# Patient Record
Sex: MALE | Race: WHITE | NOT HISPANIC OR LATINO | Employment: FULL TIME | URBAN - NONMETROPOLITAN AREA
[De-identification: names, ages, dates, MRNs, and addresses within clinical notes are randomized per-mention and may not be internally consistent; named-entity substitution may affect disease eponyms.]

---

## 2024-05-06 ENCOUNTER — TRANSCRIBE ORDERS (OUTPATIENT)
Dept: CARDIOLOGY | Facility: CLINIC | Age: 54
End: 2024-05-06
Payer: MEDICARE

## 2024-05-06 ENCOUNTER — TELEPHONE (OUTPATIENT)
Dept: CARDIOLOGY | Facility: CLINIC | Age: 54
End: 2024-05-06
Payer: MEDICARE

## 2024-05-06 DIAGNOSIS — R94.31 EKG ABNORMALITY: Primary | ICD-10-CM

## 2024-05-23 ENCOUNTER — OFFICE VISIT (OUTPATIENT)
Dept: CARDIOLOGY | Facility: CLINIC | Age: 54
End: 2024-05-23
Payer: COMMERCIAL

## 2024-05-23 VITALS
OXYGEN SATURATION: 99 % | BODY MASS INDEX: 28.06 KG/M2 | HEART RATE: 72 BPM | HEIGHT: 70 IN | DIASTOLIC BLOOD PRESSURE: 80 MMHG | WEIGHT: 196 LBS | SYSTOLIC BLOOD PRESSURE: 120 MMHG | RESPIRATION RATE: 16 BRPM

## 2024-05-23 DIAGNOSIS — I49.3 PVC (PREMATURE VENTRICULAR CONTRACTION): ICD-10-CM

## 2024-05-23 DIAGNOSIS — I45.2 BIFASCICULAR BLOCK: ICD-10-CM

## 2024-05-23 DIAGNOSIS — R94.31 ABNORMAL EKG: Primary | ICD-10-CM

## 2024-05-23 DIAGNOSIS — E78.00 HYPERCHOLESTEROLEMIA: ICD-10-CM

## 2024-05-23 DIAGNOSIS — I10 ESSENTIAL HYPERTENSION: ICD-10-CM

## 2024-05-23 PROCEDURE — 99204 OFFICE O/P NEW MOD 45 MIN: CPT | Performed by: INTERNAL MEDICINE

## 2024-05-23 PROCEDURE — 93000 ELECTROCARDIOGRAM COMPLETE: CPT | Performed by: INTERNAL MEDICINE

## 2024-05-23 RX ORDER — LOSARTAN POTASSIUM 50 MG/1
50 TABLET ORAL DAILY
COMMUNITY
Start: 2024-05-10

## 2024-05-23 RX ORDER — ROSUVASTATIN CALCIUM 10 MG/1
10 TABLET, COATED ORAL NIGHTLY
COMMUNITY
Start: 2024-05-06

## 2024-05-23 NOTE — PROGRESS NOTES
[facility] [place]  [facility] CARDIOLOGY  [address] [place] [address]  Dept: [phone]  Dept Fax: [phone]    Johanny Bhatt  1970    New Patient Office Note    History of Present Illness:  Johanny Bhatt is a 54 y.o. 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 He does not smokes and does not drink, , his cardiac exam is normal, with a questionable click at the mitral valve, his EKG today in addition to the RBBB and lASHB, has frequents PVC and PAC`S, he is totally asymptomatic, his recently labs indicates Ldl 131, and normal chemistries otherwise with mild elevation of the glucose,I will get a ESR, Troponin and CPK, will get a Holter 48 hours and also an echo, , we might need further testing as cardiac MRI, stress test after first steps    The following portions of the patient's history were reviewed and updated as appropriate: allergies, current medications, past family history, past medical history, past social history, past surgical history, and problem list.    Medications:  losartan  rosuvastatin    Subjective  No Known Allergies     History reviewed. No pertinent past medical history.    History reviewed. No pertinent surgical history.    Family History   Problem Relation Age of Onset    Heart attack Father         Social History     Socioeconomic History    Marital status:    Tobacco Use    Smoking status: Never    Smokeless  "tobacco: Never   Vaping Use    Vaping status: Never Used   Substance and Sexual Activity    Alcohol use: Not Currently    Drug use: Never    Sexual activity: Never       Review of Systems   Constitutional: Negative.    HENT: Negative.     Respiratory: Negative.     Cardiovascular: Negative.    Endocrine: Negative.    Genitourinary: Negative.    Musculoskeletal: Negative.    Skin: Negative.    Allergic/Immunologic: Negative.    Neurological: Negative.    Hematological: Negative.    Psychiatric/Behavioral: Negative.         Cardiovascular Procedures    ECHO/MUGA:  STRESS TESTS:   CARDIAC CATH:   DEVICES:   HOLTER:   CT/MRI:   VASCULAR:   CARDIOTHORACIC:     Objective  Vitals:    05/23/24 1509 05/23/24 1551   BP: 144/86 120/80   BP Location: Right arm    Patient Position: Lying    Cuff Size: Adult    Pulse: 72    Resp: 16    SpO2: 99%    Weight: 88.9 kg (196 lb)    Height: 177.8 cm (70\")    PainSc: 0-No pain        Physical Exam  Vitals reviewed.   Constitutional:       Appearance: Healthy appearance. Not in distress.   Eyes:      Pupils: Pupils are equal, round, and reactive to light.   HENT:    Mouth/Throat:      Pharynx: Oropharynx is clear.   Neck:      Thyroid: Thyroid normal.      Vascular: No JVR. JVD normal.   Pulmonary:      Effort: Pulmonary effort is normal.      Breath sounds: Normal breath sounds. No wheezing. No rhonchi. No rales.   Chest:      Chest wall: Not tender to palpatation.   Cardiovascular:      PMI at left midclavicular line. Normal rate. Regular rhythm. Normal S1. Normal S2.       Murmurs: There is no murmur.      No gallop.  No click. No rub.   Pulses:     Intact distal pulses.      Carotid: 3+ bilaterally.     Radial: 3+ bilaterally.     Femoral: 3+ bilaterally.     Dorsalis pedis: 3+ bilaterally.     Posterior tibial: 3+ bilaterally.  Edema:     Peripheral edema absent.   Abdominal:      General: Bowel sounds are normal.      Palpations: Abdomen is soft.      Tenderness: There is no " abdominal tenderness.   Musculoskeletal: Normal range of motion.         General: No tenderness.      Cervical back: Normal range of motion and neck supple. Skin:     General: Skin is warm and dry.   Neurological:      General: No focal deficit present.      Mental Status: Alert and oriented to person, place and time.        Diagnostic Data    ECG 12 Lead    Date/Time: 5/23/2024 5:18 PM  Performed by: Jeremiah Hernandez MD    Authorized by: Jeremiah Hernandez MD  Comparison: compared with previous ECG from 4/7/2024  Similar to previous ECG  Rhythm: sinus rhythm  Ectopy: unifocal PVCs and atrial premature contractions  Rate: normal  BPM: 83  Conduction: right bundle branch block and left anterior fascicular block  QRS axis: normal    Clinical impression: abnormal EKG        Assessment and Plan  Diagnoses and all orders for this visit:    Abnormal EKG-- RBBB and lASHB plus PVC`S and PAC`s - will get a Holter plus echo  -     Adult Transthoracic Echo Complete W/ Cont if Necessary Per Protocol; Future  -     Holter Monitor - 48 Hour; Future  -     Cancel: CK  -     Cancel: Troponin I  -     Cancel: Sedimentation Rate  -     CK  -     Sedimentation Rate  -     Troponin I  -     Troponin T    Essential hypertension- On Losartan 50 mg, BP is 120.80  -     Adult Transthoracic Echo Complete W/ Cont if Necessary Per Protocol; Future  -     Holter Monitor - 48 Hour; Future  -     Cancel: CK  -     Cancel: Troponin I  -     Cancel: Sedimentation Rate  -     CK  -     Sedimentation Rate  -     Troponin I  -     Troponin T    Hypercholesterolemia- On Crestor recently started LDL was 131  -     Cancel: CK  -     Cancel: Troponin I  -     Cancel: Sedimentation Rate  -     CK  -     Sedimentation Rate  -     Troponin I  -     Troponin T    Bifascicular block- this is new, will get a holter and will need a stress test after   -     Adult Transthoracic Echo Complete W/ Cont if Necessary Per Protocol; Future  -     Holter  Monitor - 48 Hour; Future  -     Cancel: CK  -     Cancel: Troponin I  -     Cancel: Sedimentation Rate  -     CK  -     Sedimentation Rate  -     Troponin I  -     Troponin T    PVC (premature ventricular contraction)- asymptomatic, will get a Holter   -     Adult Transthoracic Echo Complete W/ Cont if Necessary Per Protocol; Future  -     Holter Monitor - 48 Hour; Future  -     Cancel: CK  -     Cancel: Troponin I  -     Cancel: Sedimentation Rate  -     CK  -     Sedimentation Rate  -     Troponin I  -     Troponin T    Other orders  -     rosuvastatin (CRESTOR) 10 MG tablet; Take 1 tablet by mouth Every Night.  -     losartan (COZAAR) 50 MG tablet; Take 1 tablet by mouth Daily.        Return in about 2 weeks (around 6/6/2024) for Recheck with Dr. Hernandez.    Jeremiah Hernandez MD  05/23/2024

## 2024-05-24 ENCOUNTER — TELEPHONE (OUTPATIENT)
Dept: CARDIOLOGY | Facility: CLINIC | Age: 54
End: 2024-05-24
Payer: COMMERCIAL

## 2024-05-24 LAB
CK SERPL-CCNC: 467 U/L (ref 41–331)
ERYTHROCYTE [SEDIMENTATION RATE] IN BLOOD BY WESTERGREN METHOD: 6 MM/HR (ref 0–30)
TROPONIN T SERPL HS-MCNC: 10 NG/L (ref 0–22)

## 2024-05-24 NOTE — TELEPHONE ENCOUNTER
Troponin is normal, CPK is elevated,  467, but that might be related to physical activities, ask if he has any muscle soreness ?  None . Pt said he does have extra beats a couple months ago short periods of time He does have an Holter on and would like us to look at Tuesday.

## 2024-06-03 ENCOUNTER — TELEPHONE (OUTPATIENT)
Dept: CARDIOLOGY | Facility: CLINIC | Age: 54
End: 2024-06-03

## 2024-06-03 DIAGNOSIS — I48.3 TYPICAL ATRIAL FLUTTER: ICD-10-CM

## 2024-06-03 DIAGNOSIS — R94.31 ABNORMAL EKG: Primary | ICD-10-CM

## 2024-06-03 DIAGNOSIS — I45.2 BIFASCICULAR BLOCK: Primary | ICD-10-CM

## 2024-06-03 NOTE — TELEPHONE ENCOUNTER
"Spoke with Mr. Bhatt and explained that his holter monitor showed HR getting to 150s and episodes of Aflutter and longest episode over 2 hours.  He advised he did not have symptoms but believes now that in the last year to so he did feel \"flutters\"  and just didn't realize it at the time is was anything.  He is going to come on Wednesday at 3p for his echo and agreed to Dr. Hernandez going ahead and ordering the Cardiac MRI and EP referral.    "

## 2024-06-04 ENCOUNTER — TELEPHONE (OUTPATIENT)
Dept: CARDIOLOGY | Facility: CLINIC | Age: 54
End: 2024-06-04
Payer: COMMERCIAL

## 2024-06-04 NOTE — TELEPHONE ENCOUNTER
Caller: DR. GIBBS'S OFFICE    Relationship: Provider    Best call back number: [phone]    What form or medical record are you requesting: RECENT PN AND MONITOR     Who is requesting this form or medical record from you: / [facility] OFFICE, PCP    How would you like to receive the form or medical records (pick-up, mail, fax): FAX  If fax, what is the fax number: [phone]  Timeframe paperwork needed: ASAP

## 2024-06-06 ENCOUNTER — TELEPHONE (OUTPATIENT)
Dept: CARDIOLOGY | Facility: CLINIC | Age: 54
End: 2024-06-06
Payer: COMMERCIAL

## 2024-06-06 NOTE — TELEPHONE ENCOUNTER
Spoke with MR. Bhatt and advised him his echo was normal. Will continue getting the Cardiac MRI authorized and scheduled.

## 2024-06-27 ENCOUNTER — OFFICE VISIT (OUTPATIENT)
Dept: CARDIOLOGY | Facility: CLINIC | Age: 54
End: 2024-06-27
Payer: COMMERCIAL

## 2024-06-27 VITALS
WEIGHT: 194 LBS | BODY MASS INDEX: 27.77 KG/M2 | HEIGHT: 70 IN | OXYGEN SATURATION: 98 % | HEART RATE: 74 BPM | SYSTOLIC BLOOD PRESSURE: 122 MMHG | DIASTOLIC BLOOD PRESSURE: 78 MMHG

## 2024-06-27 DIAGNOSIS — I48.3 TYPICAL ATRIAL FLUTTER: ICD-10-CM

## 2024-06-27 DIAGNOSIS — R94.31 ABNORMAL EKG: Primary | ICD-10-CM

## 2024-06-27 DIAGNOSIS — I49.3 PVC (PREMATURE VENTRICULAR CONTRACTION): ICD-10-CM

## 2024-06-27 RX ORDER — METOPROLOL SUCCINATE 50 MG/1
50 TABLET, EXTENDED RELEASE ORAL DAILY
Qty: 30 TABLET | Refills: 11 | Status: SHIPPED | OUTPATIENT
Start: 2024-06-27

## 2024-06-27 NOTE — PROGRESS NOTES
Cardiac Electrophysiology Outpatient Note  [facility]    Office Visit     Johanny Bhatt  [id number]  06/27/2024    Primary Care Physician: Viktor De Oliveira MD    Referred By: Jeremiah Hernandez, *    Subjective     Chief Complaint   Patient presents with    premature ventricular contraction      Problem List:  Atrial flutter  TTE 6/2024: LVEF 66-70%, grade 1 diastolic dysfunction, no significant VHD, RVSP 35-45  48h monitor 6/2024: 50/75/118 bpm, 15% atrial flutter longest 2.3 hours, 0.4% PVCs  Bifascicular block  Hypertension  Dyslipidemia        History of Present Illness:   Johanny Bhatt is a 54 y.o. male  who presents to my electrophysiology clinic as a referral for atrial flutter and bifascicular block from Dr. Hernandez.    He is for the most part been asymptomatic but is issues were found incidentally on screening EKG as he is a .  He is found to have a bifascicular block and referred to Dr. Hernandez for further management.  He was also having frequent PACs/PVCs on EKG.  Ambulatory monitor was performed.  Several episodes consistent with atrial flutter.  He underwent echocardiogram which was overall normal.  Scheduled for MRI next week.        History reviewed. No pertinent past medical history.    History reviewed. No pertinent surgical history.    Family History   Problem Relation Age of Onset    Heart attack Father        Social History     Socioeconomic History    Marital status:    Tobacco Use    Smoking status: Never    Smokeless tobacco: Never   Vaping Use    Vaping status: Never Used   Substance and Sexual Activity    Alcohol use: Not Currently    Drug use: Never    Sexual activity: Never         Current Outpatient Medications:     losartan (COZAAR) 50 MG tablet, Take 1 tablet by mouth Daily., Disp: , Rfl:     rosuvastatin (CRESTOR) 10 MG tablet, Take 1 tablet by mouth Every Night., Disp: , Rfl:     apixaban (ELIQUIS)  "5 MG tablet tablet, Take 1 tablet by mouth 2 (Two) Times a Day., Disp: 60 tablet, Rfl: 3    metoprolol succinate XL (TOPROL-XL) 50 MG 24 hr tablet, Take 1 tablet by mouth Daily., Disp: 30 tablet, Rfl: 11    Allergies:   No Known Allergies    Objective   Vital Signs: Blood pressure 122/78, pulse 74, height 177.8 cm (70\"), weight 88 kg (194 lb), SpO2 98%.    PHYSICAL EXAM  General appearance: Awake, alert, cooperative  Head: Normocephalic, without obvious abnormality, atraumatic  Neck: No JVD  Lungs: Clear to ascultation bilaterally  Heart: Regular rate and rhythm, no murmurs, 2+ LE pulses, no lower extremity swelling  Skin: Skin color, turgor normal, no rashes or lesions  Neurologic: Grossly normal     No results found for: \"GLUCOSE\", \"CALCIUM\", \"NA\", \"K\", \"CO2\", \"CL\", \"BUN\", \"CREATININE\", \"EGFRIFAFRI\", \"EGFRIFNONA\", \"BCR\", \"ANIONGAP\"  No results found for: \"WBC\", \"HGB\", \"HCT\", \"MCV\", \"PLT\"  No results found for: \"INR\", \"PROTIME\"  No results found for: \"TSH\", \"E4QATAH\", \"T4SSWWV\", \"THYROIDAB\"       Results for orders placed in visit on 06/05/24    Adult Transthoracic Echo Complete W/ Cont if Necessary Per Protocol    Interpretation Summary    Left ventricular systolic function is normal. Calculated left ventricular EF = 66% Left ventricular ejection fraction appears to be 66 - 70%.    Left ventricular diastolic function is consistent with (grade I) impaired relaxation.    Aortic valve area is 3.1 cm2.Normal aortic valve    Peak velocity of the flow distal to the aortic valve is 147.1 cm/s. Aortic valve maximum pressure gradient is 9 mmHg. Aortic valve mean pressure gradient is 5 mmHg.    The mitral valve peak gradient is 3 mmHg. The mitral valve mean gradient is 1 mmHg. The mitral valve area (PHT) is 3.6 cm2. Normal mitral valve    Estimated right ventricular systolic pressure from tricuspid regurgitation is mildly elevated (35-45 mmHg). Calculated right ventricular systolic pressure from tricuspid regurgitation is " 38 mmHg.    The aortic root measures 2.9 cm.    Normal RV size and function    No pericardial effusion         I personally viewed and interpreted the patient's EKG/Telemetry/lab data    Procedures    Johanny Bhatt  reports that he has never smoked. He has never used smokeless tobacco.           Assessment & Plan     1. Abnormal EKG  Patient was initially referred to Dr. Hernandez for an abnormal EKG on screening.  I reviewed his EKG today that shows sinus rhythm with bifascicular block.  Previous EKG also had frequent PACs/PVCs.  While bifascicular block is a nonspecific finding, it is somewhat unusual in an otherwise healthy 54-year-old.  Echocardiogram was normal.  Given that he also seems to have episodes of atrial flutter, I think it is reasonable to look for underlying structural heart disease including ischemia and possibly infiltrative processes.  Dr. Hernandez has an MRI scheduled for next week which I agree with.  I think significant coronary disease is unlikely, however it will be reasonable to rule this out with either stress test or coronary CTA.  Will discuss with Dr. Hernandez.    2. Typical atrial flutter  Patient wore an ambulatory monitor with what appears to be atrial flutter.  Overall burden on the monitor was approximately 40%.  Overall was asymptomatic with this.  We discussed the pathophysiology of atrial flutter as well as a relationship with atrial fibrillation.  We also discussed potential treatment options for this.  First we will get the results of his MRI as above.  We discussed the pros and cons of aspirin monotherapy versus Eliquis.  After this discussion they elected to start with blood risk which I agree with.  Will start him on 5 mg twice daily.  After the results of the above testing we may elect to proceed with catheter ablation.  We discussed in detail how this was performed.    3. PVC (premature ventricular contraction)  Prior EKG showed PACs and PVCs.  Ambulatory monitor showed  5% PACs and 1% PVCs.  Overall not significantly symptomatic at the current time.  Will continue to monitor.       Follow Up:  Return in about 3 months (around 9/27/2024) for Recheck.      Thank you for allowing me to participate in the care of your patient. Please do not hesitate to contact me with additional questions or concerns.      Louis Branch M.D.  Cardiac Electrophysiologist  [facility] / [facility]

## 2024-07-01 ENCOUNTER — HOSPITAL ENCOUNTER (OUTPATIENT)
Facility: HOSPITAL | Age: 54
Discharge: HOME | End: 2024-07-01
Admitting: INTERNAL MEDICINE
Payer: COMMERCIAL

## 2024-07-01 PROCEDURE — 0 GADOBENATE DIMEGLUMINE 529 MG/ML SOLUTION: Performed by: INTERNAL MEDICINE

## 2024-07-01 PROCEDURE — A9577 INJ MULTIHANCE: HCPCS | Performed by: INTERNAL MEDICINE

## 2024-07-01 PROCEDURE — 75561 CARDIAC MRI FOR MORPH W/DYE: CPT

## 2024-07-01 RX ADMIN — GADOBENATE DIMEGLUMINE 8 ML: 529 INJECTION, SOLUTION INTRAVENOUS at 20:14

## 2024-07-01 RX ADMIN — GADOBENATE DIMEGLUMINE 20 ML: 529 INJECTION, SOLUTION INTRAVENOUS at 20:14

## 2024-07-03 ENCOUNTER — TELEPHONE (OUTPATIENT)
Dept: CARDIOLOGY | Facility: CLINIC | Age: 54
End: 2024-07-03

## 2024-07-03 ENCOUNTER — OFFICE VISIT (OUTPATIENT)
Dept: CARDIOLOGY | Facility: CLINIC | Age: 54
End: 2024-07-03
Payer: COMMERCIAL

## 2024-07-03 VITALS
WEIGHT: 193 LBS | RESPIRATION RATE: 18 BRPM | HEART RATE: 88 BPM | BODY MASS INDEX: 27.63 KG/M2 | HEIGHT: 70 IN | SYSTOLIC BLOOD PRESSURE: 124 MMHG | DIASTOLIC BLOOD PRESSURE: 74 MMHG | OXYGEN SATURATION: 99 %

## 2024-07-03 DIAGNOSIS — E78.00 HYPERCHOLESTEROLEMIA: ICD-10-CM

## 2024-07-03 DIAGNOSIS — I10 ESSENTIAL HYPERTENSION: ICD-10-CM

## 2024-07-03 DIAGNOSIS — I49.3 PVC (PREMATURE VENTRICULAR CONTRACTION): ICD-10-CM

## 2024-07-03 DIAGNOSIS — R94.31 ABNORMAL EKG: ICD-10-CM

## 2024-07-03 DIAGNOSIS — I45.2 BIFASCICULAR BLOCK: Primary | ICD-10-CM

## 2024-07-03 PROCEDURE — 99214 OFFICE O/P EST MOD 30 MIN: CPT | Performed by: INTERNAL MEDICINE

## 2024-07-03 PROCEDURE — 93000 ELECTROCARDIOGRAM COMPLETE: CPT | Performed by: INTERNAL MEDICINE

## 2024-07-03 NOTE — TELEPHONE ENCOUNTER
"  Caller: Johanny Bhatt \"Les\"    Relationship: Self    Best call back number: [phone]    What is the best time to reach you: ANY    What was the call regarding: PATIENT HAS A SCHEDULED APPOINTMENT THIS AFTERNOON BUT HE THINKS THE PRIMARY REASON FOR VISIT IS TO DISCUSS THE MRI RESULTS WHICH WAS DONE ON 7.1.2024. DOES HE NEED TO KEEP THE APPOINTMENT TODAY OR WAIT UNTIL THE MRI RESULTS ARE IN? PLEASE CALL THE PATIENT TO ADVISE ASAP.     Is it okay if the provider responds through MyChart: NO    "

## 2024-07-03 NOTE — TELEPHONE ENCOUNTER
"    Name: Johanny Bhatt \"Les\"    Relationship: Self    Best Callback Number: [phone]    HUB PROVIDED THE RELAY MESSAGE FROM THE OFFICE   PATIENT VOICED UNDERSTANDING AND HAS NO FURTHER QUESTIONS AT THIS TIME    ADDITIONAL INFORMATION:   "

## 2024-07-03 NOTE — PROGRESS NOTES
[facility] [place]  [facility] CARDIOLOG[facility]  [address] [place] [address]  Dept: [phone]  Dept Fax: [phone]    Johanny Bhatt  1970    Follow Up Office Visit Note    History of Present Illness:  Johanny Bhatt is a 54 y.o. male who presents to the clinic for Follow-up.Abnormal EKG, he was found to have bifascicular block RBBB and also LASHB, he has also some PVC`S we did give him a Holter with frequents episodes of atrial flutter- longest over 2 hours, , he also had a cardiac MRI normal, he has seen Dr Mcdonald and was placed on Eliquis 5 mg bid and also Toprol xl 50 mg, he feels something  weird on his chest maybe fluttering a little less on Toprol BP is good 110.70, will see next month dr mcdonald and will likely go for ablation    The following portions of the patient's history were reviewed and updated as appropriate: allergies, current medications, past family history, past medical history, past social history, past surgical history, and problem list.    Medications:  apixaban tablet  losartan  metoprolol succinate XL  rosuvastatin    Subjective  No Known Allergies     History reviewed. No pertinent past medical history.    History reviewed. No pertinent surgical history.    Family History   Problem Relation Age of Onset   • Heart attack Father         Social History     Socioeconomic History   • Marital status:    Tobacco Use   • Smoking status: Never   • Smokeless tobacco: Never   Vaping Use   • Vaping status: Never Used   Substance and Sexual Activity   • Alcohol use: Not Currently   • Drug use: Never   • Sexual activity: Never       Review of Systems   Constitutional: Negative.    HENT: Negative.     Respiratory: Negative.     Cardiovascular:  Positive for palpitations.   Endocrine: Negative.    Genitourinary: Negative.    Musculoskeletal: Negative.    Skin: Negative.    Allergic/Immunologic: Negative.    Neurological: Negative.   "  Hematological: Negative.    Psychiatric/Behavioral: Negative.       Cardiovascular Procedures    ECHO/MUGA:  STRESS TESTS:   CARDIAC CATH:   DEVICES:   HOLTER:   CT/MRI:   VASCULAR:   CARDIOTHORACIC:     Objective  Vitals:    07/03/24 1406   BP: 124/74   BP Location: Right arm   Patient Position: Lying   Cuff Size: Adult   Pulse: 88   Resp: 18   SpO2: 99%   Weight: 87.5 kg (193 lb)   Height: 177.8 cm (70\")   PainSc: 0-No pain     Body mass index is 27.69 kg/m².     Physical Exam  Vitals reviewed.   Constitutional:       Appearance: Healthy appearance. Not in distress.   Eyes:      Pupils: Pupils are equal, round, and reactive to light.   HENT:    Mouth/Throat:      Pharynx: Oropharynx is clear.   Neck:      Thyroid: Thyroid normal.      Vascular: No JVR. JVD normal.   Pulmonary:      Effort: Pulmonary effort is normal.      Breath sounds: Normal breath sounds. No wheezing. No rhonchi. No rales.   Chest:      Chest wall: Not tender to palpatation.   Cardiovascular:      PMI at left midclavicular line. Normal rate. Regular rhythm. Normal S1. Normal S2.       Murmurs: There is no murmur.      No gallop.  No click. No rub.   Pulses:     Intact distal pulses.      Carotid: 3+ bilaterally.     Radial: 3+ bilaterally.     Femoral: 3+ bilaterally.     Dorsalis pedis: 3+ bilaterally.     Posterior tibial: 3+ bilaterally.  Edema:     Peripheral edema absent.   Abdominal:      General: Bowel sounds are normal.      Palpations: Abdomen is soft.      Tenderness: There is no abdominal tenderness.   Musculoskeletal: Normal range of motion.         General: No tenderness.      Cervical back: Normal range of motion and neck supple. Skin:     General: Skin is warm and dry.   Neurological:      General: No focal deficit present.      Mental Status: Alert and oriented to person, place and time.        Diagnostic Data    ECG 12 Lead    Date/Time: 7/3/2024 2:50 PM  Performed by: Jeremiah Hernandez MD    Authorized by: Jeremiah Hernandez " MD Laith  Comparison: compared with previous ECG from 5/23/2024  Similar to previous ECG  Rhythm: sinus rhythm  Rate: normal  BPM: 57  Conduction: right bundle branch block and left anterior fascicular block  QRS axis: left    Clinical impression: abnormal EKG        Assessment and Plan  Diagnoses and all orders for this visit:    Bifascicular block- RBBB and lASHB stable HR 57,     Abnormal EKG- as above    Essential hypertension- BP is better 110.60 on Losartan 50 mg and Metoprolol 50 mg bid,    Hypercholesterolemia-- on Crestor 10 mg, tolerates well     PVC (premature ventricular contraction)- less than 1% by Holter  Atrial flutter- Sen on Holter on Toprol xl 50 mg and also Eliquis 5mg bid         Return in about 6 months (around 1/3/2025) for Recheck with Dr. Hernandez.    Jeremiah Hernandez MD  07/03/2024

## 2024-07-03 NOTE — TELEPHONE ENCOUNTER
Tried reaching pt to inform them that we already have his MRI results so there is no need to reschedule his appt today. Unable to reach, left a voicemail.   HUB OKAY TO RELAY MESSAGE

## 2024-07-10 ENCOUNTER — TELEPHONE (OUTPATIENT)
Dept: CARDIOLOGY | Facility: CLINIC | Age: 54
End: 2024-07-10
Payer: COMMERCIAL

## 2024-07-10 NOTE — TELEPHONE ENCOUNTER
Pt called stating since he was started on the metoprolol succinate 50mg, he has been having a few episodes of dizziness with position changes and just generalized feelings of weakness and fatigue. His BP has been running upper 90s systolic to lower 100s systolic and his HR is now in the low to mid 50s. He is currently taking both the metoprolol and losartan in the morning and I suggested he could move one to the evening as they are both once daily medications. I also advised him to stay hydrated and change positions slowly.    Pt is going to send in log of BP/HR through [employer] message.     Pt also spoke with Dr. Hernandez a week ago and after their conversation, he is leaning towards proceeding with an ablation. He would like to follow up with you to discuss the ablation again in office. OK to schedule f/u appt?

## 2024-07-25 ENCOUNTER — OFFICE VISIT (OUTPATIENT)
Dept: CARDIOLOGY | Facility: CLINIC | Age: 54
End: 2024-07-25
Payer: COMMERCIAL

## 2024-07-25 VITALS
WEIGHT: 193.8 LBS | HEART RATE: 86 BPM | DIASTOLIC BLOOD PRESSURE: 68 MMHG | BODY MASS INDEX: 27.75 KG/M2 | HEIGHT: 70 IN | OXYGEN SATURATION: 97 % | SYSTOLIC BLOOD PRESSURE: 122 MMHG

## 2024-07-25 DIAGNOSIS — I48.3 TYPICAL ATRIAL FLUTTER: Primary | ICD-10-CM

## 2024-07-25 DIAGNOSIS — I45.2 BIFASCICULAR BLOCK: ICD-10-CM

## 2024-07-25 DIAGNOSIS — I49.3 PVC (PREMATURE VENTRICULAR CONTRACTION): ICD-10-CM

## 2024-07-25 PROCEDURE — 99214 OFFICE O/P EST MOD 30 MIN: CPT | Performed by: STUDENT IN AN ORGANIZED HEALTH CARE EDUCATION/TRAINING PROGRAM

## 2024-07-25 NOTE — PROGRESS NOTES
Cardiac Electrophysiology Outpatient Note  [facility] at [facility]    Office Visit     Johanny Bhatt  [id number]  07/25/2024    Primary Care Physician: Viktor De Oliveira MD    Referred By: No ref. provider found    Subjective     Chief Complaint   Patient presents with    Abnormal ECG     Problem List:  Atrial flutter  TTE 6/2024: LVEF 66-70%, grade 1 diastolic dysfunction, no significant VHD, RVSP 35-45  48h monitor 6/2024: 50/75/118 bpm, 15% atrial flutter longest 2.3 hours, 0.4% PVCs  Bifascicular block  Cardiac MRI 7/2024: normal sized LV with LVEF 58%, no scar, normal RV and RV systolic fx, no significant VHD  Hypertension  Dyslipidemia    History of Present Illness:   Johanny Bhatt is a 54 y.o. male who presents to my electrophysiology clinic for follow up of atrial flutter and bifascicular block.  We discussed catheter ablation in detail at his last visit approximately 4 weeks ago.  Since then, he has no significant change in his cardiac symptoms.  He still experiences palpitations here and there and fatigue.  He does note that he stopped metoprolol and his fatigue has improved somewhat from that.  He denies any chest pain, shortness of breath, orthopnea, syncopal episode, lower extremity edema.    History reviewed. No pertinent past medical history.    History reviewed. No pertinent surgical history.    Family History   Problem Relation Age of Onset    Heart attack Father        Social History     Socioeconomic History    Marital status:    Tobacco Use    Smoking status: Never    Smokeless tobacco: Never   Vaping Use    Vaping status: Never Used   Substance and Sexual Activity    Alcohol use: Not Currently    Drug use: Never    Sexual activity: Never         Current Outpatient Medications:     apixaban (ELIQUIS) 5 MG tablet tablet, Take 1 tablet by mouth 2 (Two) Times a Day., Disp: 60 tablet, Rfl: 3    losartan (COZAAR) 50 MG tablet, Take 1  "tablet by mouth Daily., Disp: , Rfl:     rosuvastatin (CRESTOR) 10 MG tablet, Take 1 tablet by mouth Every Night., Disp: , Rfl:     Allergies:   No Known Allergies    Objective   Vital Signs: Blood pressure 122/68, pulse 86, height 177.8 cm (70\"), weight 87.9 kg (193 lb 12.8 oz), SpO2 97%.    PHYSICAL EXAM   General appearance: Awake, alert, cooperative  Head: Normocephalic, without obvious abnormality, atraumatic  Lungs: Clear to ascultation bilaterally  Heart: Regular rate and rhythm, no murmurs, no lower extremity swelling  Skin: Skin color, turgor normal, no rashes or lesions  Neurologic: Grossly normal     No results found for: \"GLUCOSE\", \"CALCIUM\", \"NA\", \"K\", \"CO2\", \"CL\", \"BUN\", \"CREATININE\", \"EGFRIFAFRI\", \"EGFRIFNONA\", \"BCR\", \"ANIONGAP\"  No results found for: \"WBC\", \"HGB\", \"HCT\", \"MCV\", \"PLT\"  No results found for: \"INR\", \"PROTIME\"  No results found for: \"TSH\", \"R2UELMW\", \"N4MMKPZ\", \"THYROIDAB\"       Results for orders placed in visit on 06/05/24    Adult Transthoracic Echo Complete W/ Cont if Necessary Per Protocol    Interpretation Summary    Left ventricular systolic function is normal. Calculated left ventricular EF = 66% Left ventricular ejection fraction appears to be 66 - 70%.    Left ventricular diastolic function is consistent with (grade I) impaired relaxation.    Aortic valve area is 3.1 cm2.Normal aortic valve    Peak velocity of the flow distal to the aortic valve is 147.1 cm/s. Aortic valve maximum pressure gradient is 9 mmHg. Aortic valve mean pressure gradient is 5 mmHg.    The mitral valve peak gradient is 3 mmHg. The mitral valve mean gradient is 1 mmHg. The mitral valve area (PHT) is 3.6 cm2. Normal mitral valve    Estimated right ventricular systolic pressure from tricuspid regurgitation is mildly elevated (35-45 mmHg). Calculated right ventricular systolic pressure from tricuspid regurgitation is 38 mmHg.    The aortic root measures 2.9 cm.    Normal RV size and function    No pericardial " effusion         I personally viewed and interpreted the patient's EKG/Telemetry/lab data    Procedures    Johanny Bhatt  reports that he has never smoked. He has never used smokeless tobacco.        Advance Care Planning   Advance Care Planning: ACP discussion was declined by the patient. Patient does not have an advance directive, declines further assistance.     Assessment & Plan    1.  Bifascicular block  While bifascicular block is a nonspecific finding, it is somewhat unusual in an otherwise healthy 54-year-old.  Echocardiogram was normal.  Cardiac MRI was negative for scar or underlying infiltrative process.     2. Typical atrial flutter  Patient wore an ambulatory monitor with what appears to be atrial flutter.  Overall burden on the monitor was approximately 40%.  Overall was asymptomatic with this.  We discussed the pathophysiology of atrial flutter as well as a relationship with atrial fibrillation and the progressive nature of this arrhythmia.  We also discussed potential treatment options for this being rate control vs antiarrhythmic medical therapy vs catheter ablation. After shared decision making, the patient and I would like to proceed with atrial flutter ablation. The nature of the procedure as well as the benefits, risks and alternatives to the procedure was discussed in detail. The patient nad family expressed understanding and wish to proceed.       3. PVC (premature ventricular contraction)  Prior EKG showed PACs and PVCs.  Ambulatory monitor showed 5% PACs and 1% PVCs.  Overall not significantly symptomatic at the current time.  Will continue to monitor.    Follow Up: Will call patient to schedule ablation.      Thank you for allowing me to participate in the care of your patient. Please do not hesitate to contact me with additional questions or concerns.      Scribed by Kirt Garsia PA-C for:  Louis Branch M.D.  Cardiac Electrophysiologist  [facility] / [facility]        I, Louis Branch MD, personally performed the services described in this documentation as scribed by the above named individual in my presence, and it is both accurate and complete.  7/25/2024  14:51 EDT

## 2024-08-23 ENCOUNTER — PREP FOR SURGERY (OUTPATIENT)
Dept: OTHER | Facility: HOSPITAL | Age: 54
End: 2024-08-23
Payer: COMMERCIAL

## 2024-08-23 DIAGNOSIS — I48.3 TYPICAL ATRIAL FLUTTER: Primary | ICD-10-CM

## 2024-08-23 RX ORDER — ACETAMINOPHEN 325 MG/1
650 TABLET ORAL EVERY 4 HOURS PRN
OUTPATIENT
Start: 2024-08-23

## 2024-08-23 RX ORDER — NITROGLYCERIN 0.4 MG/1
0.4 TABLET SUBLINGUAL
OUTPATIENT
Start: 2024-08-23

## 2024-08-23 RX ORDER — SODIUM CHLORIDE 9 MG/ML
40 INJECTION, SOLUTION INTRAVENOUS AS NEEDED
OUTPATIENT
Start: 2024-08-23

## 2024-08-23 RX ORDER — ONDANSETRON 2 MG/ML
4 INJECTION INTRAMUSCULAR; INTRAVENOUS EVERY 6 HOURS PRN
OUTPATIENT
Start: 2024-08-23

## 2024-10-04 ENCOUNTER — PRE-ADMISSION TESTING (OUTPATIENT)
Dept: PREADMISSION TESTING | Facility: HOSPITAL | Age: 54
End: 2024-10-04
Payer: COMMERCIAL

## 2024-10-04 DIAGNOSIS — I48.3 TYPICAL ATRIAL FLUTTER: ICD-10-CM

## 2024-10-04 LAB
ANION GAP SERPL CALCULATED.3IONS-SCNC: 11 MMOL/L (ref 5–15)
BUN SERPL-MCNC: 17 MG/DL (ref 6–20)
BUN/CREAT SERPL: 15.5 (ref 7–25)
CALCIUM SPEC-SCNC: 9.3 MG/DL (ref 8.6–10.5)
CHLORIDE SERPL-SCNC: 104 MMOL/L (ref 98–107)
CO2 SERPL-SCNC: 24 MMOL/L (ref 22–29)
CREAT SERPL-MCNC: 1.1 MG/DL (ref 0.76–1.27)
DEPRECATED RDW RBC AUTO: 41.5 FL (ref 37–54)
EGFRCR SERPLBLD CKD-EPI 2021: 78.8 ML/MIN/1.73
ERYTHROCYTE [DISTWIDTH] IN BLOOD BY AUTOMATED COUNT: 12.3 % (ref 12.3–15.4)
GLUCOSE SERPL-MCNC: 98 MG/DL (ref 65–99)
HCT VFR BLD AUTO: 47 % (ref 37.5–51)
HGB BLD-MCNC: 15.9 G/DL (ref 13–17.7)
MCH RBC QN AUTO: 31 PG (ref 26.6–33)
MCHC RBC AUTO-ENTMCNC: 33.8 G/DL (ref 31.5–35.7)
MCV RBC AUTO: 91.6 FL (ref 79–97)
PLATELET # BLD AUTO: 247 10*3/MM3 (ref 140–450)
PMV BLD AUTO: 10.6 FL (ref 6–12)
POTASSIUM SERPL-SCNC: 4.5 MMOL/L (ref 3.5–5.2)
RBC # BLD AUTO: 5.13 10*6/MM3 (ref 4.14–5.8)
SODIUM SERPL-SCNC: 139 MMOL/L (ref 136–145)
WBC NRBC COR # BLD AUTO: 8.6 10*3/MM3 (ref 3.4–10.8)

## 2024-10-04 PROCEDURE — 36415 COLL VENOUS BLD VENIPUNCTURE: CPT

## 2024-10-04 PROCEDURE — 80048 BASIC METABOLIC PNL TOTAL CA: CPT

## 2024-10-04 PROCEDURE — 85027 COMPLETE CBC AUTOMATED: CPT

## 2024-10-09 ENCOUNTER — HOSPITAL ENCOUNTER (OUTPATIENT)
Facility: HOSPITAL | Age: 54
Setting detail: HOSPITAL OUTPATIENT SURGERY
Discharge: HOME | End: 2024-10-09
Attending: STUDENT IN AN ORGANIZED HEALTH CARE EDUCATION/TRAINING PROGRAM | Admitting: STUDENT IN AN ORGANIZED HEALTH CARE EDUCATION/TRAINING PROGRAM
Payer: COMMERCIAL

## 2024-10-09 VITALS
SYSTOLIC BLOOD PRESSURE: 113 MMHG | HEIGHT: 70 IN | HEART RATE: 69 BPM | BODY MASS INDEX: 27.83 KG/M2 | DIASTOLIC BLOOD PRESSURE: 89 MMHG | WEIGHT: 194.4 LBS | TEMPERATURE: 97.9 F | RESPIRATION RATE: 16 BRPM | OXYGEN SATURATION: 95 %

## 2024-10-09 DIAGNOSIS — I48.3 TYPICAL ATRIAL FLUTTER: ICD-10-CM

## 2024-10-09 PROCEDURE — 25010000002 FENTANYL CITRATE (PF) 50 MCG/ML SOLUTION: Performed by: STUDENT IN AN ORGANIZED HEALTH CARE EDUCATION/TRAINING PROGRAM

## 2024-10-09 PROCEDURE — 93623 PRGRMD STIMJ&PACG IV RX NFS: CPT | Performed by: STUDENT IN AN ORGANIZED HEALTH CARE EDUCATION/TRAINING PROGRAM

## 2024-10-09 PROCEDURE — C1760 CLOSURE DEV, VASC: HCPCS | Performed by: STUDENT IN AN ORGANIZED HEALTH CARE EDUCATION/TRAINING PROGRAM

## 2024-10-09 PROCEDURE — 93653 COMPRE EP EVAL TX SVT: CPT | Performed by: STUDENT IN AN ORGANIZED HEALTH CARE EDUCATION/TRAINING PROGRAM

## 2024-10-09 PROCEDURE — 25010000002 BUPIVACAINE 0.5 % SOLUTION: Performed by: STUDENT IN AN ORGANIZED HEALTH CARE EDUCATION/TRAINING PROGRAM

## 2024-10-09 PROCEDURE — 25010000002 HEPARIN (PORCINE) PER 1000 UNITS: Performed by: STUDENT IN AN ORGANIZED HEALTH CARE EDUCATION/TRAINING PROGRAM

## 2024-10-09 PROCEDURE — C1894 INTRO/SHEATH, NON-LASER: HCPCS | Performed by: STUDENT IN AN ORGANIZED HEALTH CARE EDUCATION/TRAINING PROGRAM

## 2024-10-09 PROCEDURE — C1732 CATH, EP, DIAG/ABL, 3D/VECT: HCPCS | Performed by: STUDENT IN AN ORGANIZED HEALTH CARE EDUCATION/TRAINING PROGRAM

## 2024-10-09 PROCEDURE — 25010000002 MIDAZOLAM PER 1 MG: Performed by: STUDENT IN AN ORGANIZED HEALTH CARE EDUCATION/TRAINING PROGRAM

## 2024-10-09 PROCEDURE — C1731 CATH, EP, 20 OR MORE ELEC: HCPCS | Performed by: STUDENT IN AN ORGANIZED HEALTH CARE EDUCATION/TRAINING PROGRAM

## 2024-10-09 PROCEDURE — 93662 INTRACARDIAC ECG (ICE): CPT | Performed by: STUDENT IN AN ORGANIZED HEALTH CARE EDUCATION/TRAINING PROGRAM

## 2024-10-09 PROCEDURE — 25010000002 LIDOCAINE 1 % SOLUTION: Performed by: STUDENT IN AN ORGANIZED HEALTH CARE EDUCATION/TRAINING PROGRAM

## 2024-10-09 PROCEDURE — 25810000003 SODIUM CHLORIDE 0.9 % SOLUTION: Performed by: STUDENT IN AN ORGANIZED HEALTH CARE EDUCATION/TRAINING PROGRAM

## 2024-10-09 PROCEDURE — C1769 GUIDE WIRE: HCPCS | Performed by: STUDENT IN AN ORGANIZED HEALTH CARE EDUCATION/TRAINING PROGRAM

## 2024-10-09 PROCEDURE — 94660 CPAP INITIATION&MGMT: CPT

## 2024-10-09 PROCEDURE — 99152 MOD SED SAME PHYS/QHP 5/>YRS: CPT | Performed by: STUDENT IN AN ORGANIZED HEALTH CARE EDUCATION/TRAINING PROGRAM

## 2024-10-09 PROCEDURE — 99153 MOD SED SAME PHYS/QHP EA: CPT | Performed by: STUDENT IN AN ORGANIZED HEALTH CARE EDUCATION/TRAINING PROGRAM

## 2024-10-09 PROCEDURE — C1759 CATH, INTRA ECHOCARDIOGRAPHY: HCPCS | Performed by: STUDENT IN AN ORGANIZED HEALTH CARE EDUCATION/TRAINING PROGRAM

## 2024-10-09 PROCEDURE — C1893 INTRO/SHEATH, FIXED,NON-PEEL: HCPCS | Performed by: STUDENT IN AN ORGANIZED HEALTH CARE EDUCATION/TRAINING PROGRAM

## 2024-10-09 PROCEDURE — 25010000002 ONDANSETRON PER 1 MG: Performed by: STUDENT IN AN ORGANIZED HEALTH CARE EDUCATION/TRAINING PROGRAM

## 2024-10-09 PROCEDURE — 25010000002 ADENOSINE PER 6 MG: Performed by: STUDENT IN AN ORGANIZED HEALTH CARE EDUCATION/TRAINING PROGRAM

## 2024-10-09 RX ORDER — SODIUM CHLORIDE 9 MG/ML
40 INJECTION, SOLUTION INTRAVENOUS AS NEEDED
Status: DISCONTINUED | OUTPATIENT
Start: 2024-10-09 | End: 2024-10-09 | Stop reason: HOSPADM

## 2024-10-09 RX ORDER — ACETAMINOPHEN 325 MG/1
650 TABLET ORAL EVERY 4 HOURS PRN
Status: DISCONTINUED | OUTPATIENT
Start: 2024-10-09 | End: 2024-10-09 | Stop reason: HOSPADM

## 2024-10-09 RX ORDER — NITROGLYCERIN 0.4 MG/1
0.4 TABLET SUBLINGUAL
Status: DISCONTINUED | OUTPATIENT
Start: 2024-10-09 | End: 2024-10-09 | Stop reason: HOSPADM

## 2024-10-09 RX ORDER — LIDOCAINE HYDROCHLORIDE 10 MG/ML
INJECTION, SOLUTION INFILTRATION; PERINEURAL
Status: DISCONTINUED | OUTPATIENT
Start: 2024-10-09 | End: 2024-10-09 | Stop reason: HOSPADM

## 2024-10-09 RX ORDER — ACETAMINOPHEN 650 MG/1
650 SUPPOSITORY RECTAL EVERY 4 HOURS PRN
Status: DISCONTINUED | OUTPATIENT
Start: 2024-10-09 | End: 2024-10-09 | Stop reason: HOSPADM

## 2024-10-09 RX ORDER — ETOMIDATE 2 MG/ML
INJECTION INTRAVENOUS
Status: DISCONTINUED | OUTPATIENT
Start: 2024-10-09 | End: 2024-10-09 | Stop reason: HOSPADM

## 2024-10-09 RX ORDER — MIDAZOLAM HYDROCHLORIDE 1 MG/ML
INJECTION INTRAMUSCULAR; INTRAVENOUS
Status: DISCONTINUED | OUTPATIENT
Start: 2024-10-09 | End: 2024-10-09 | Stop reason: HOSPADM

## 2024-10-09 RX ORDER — ONDANSETRON 2 MG/ML
4 INJECTION INTRAMUSCULAR; INTRAVENOUS EVERY 6 HOURS PRN
Status: DISCONTINUED | OUTPATIENT
Start: 2024-10-09 | End: 2024-10-09 | Stop reason: HOSPADM

## 2024-10-09 RX ORDER — ADENOSINE 3 MG/ML
INJECTION, SOLUTION INTRAVENOUS
Status: DISCONTINUED | OUTPATIENT
Start: 2024-10-09 | End: 2024-10-09 | Stop reason: HOSPADM

## 2024-10-09 RX ORDER — SODIUM CHLORIDE 0.9 % (FLUSH) 0.9 %
3 SYRINGE (ML) INJECTION EVERY 12 HOURS SCHEDULED
Status: DISCONTINUED | OUTPATIENT
Start: 2024-10-09 | End: 2024-10-09 | Stop reason: HOSPADM

## 2024-10-09 RX ORDER — BUPIVACAINE HYDROCHLORIDE 5 MG/ML
INJECTION, SOLUTION PERINEURAL
Status: DISCONTINUED | OUTPATIENT
Start: 2024-10-09 | End: 2024-10-09 | Stop reason: HOSPADM

## 2024-10-09 RX ORDER — IBUPROFEN 200 MG
400 TABLET ORAL EVERY 6 HOURS PRN
Status: DISCONTINUED | OUTPATIENT
Start: 2024-10-09 | End: 2024-10-09 | Stop reason: HOSPADM

## 2024-10-09 RX ORDER — SODIUM CHLORIDE 9 MG/ML
INJECTION, SOLUTION INTRAVENOUS
Status: DISCONTINUED | OUTPATIENT
Start: 2024-10-09 | End: 2024-10-09 | Stop reason: HOSPADM

## 2024-10-09 RX ORDER — FENTANYL CITRATE 50 UG/ML
INJECTION, SOLUTION INTRAMUSCULAR; INTRAVENOUS
Status: DISCONTINUED | OUTPATIENT
Start: 2024-10-09 | End: 2024-10-09 | Stop reason: HOSPADM

## 2024-10-09 RX ORDER — SODIUM CHLORIDE 0.9 % (FLUSH) 0.9 %
10 SYRINGE (ML) INJECTION AS NEEDED
Status: DISCONTINUED | OUTPATIENT
Start: 2024-10-09 | End: 2024-10-09 | Stop reason: HOSPADM

## 2024-10-09 RX ORDER — ONDANSETRON 2 MG/ML
INJECTION INTRAMUSCULAR; INTRAVENOUS
Status: DISCONTINUED | OUTPATIENT
Start: 2024-10-09 | End: 2024-10-09 | Stop reason: HOSPADM

## 2024-10-09 RX ORDER — HEPARIN SODIUM 1000 [USP'U]/ML
INJECTION, SOLUTION INTRAVENOUS; SUBCUTANEOUS
Status: DISCONTINUED | OUTPATIENT
Start: 2024-10-09 | End: 2024-10-09 | Stop reason: HOSPADM

## 2024-10-09 NOTE — Clinical Note
Replaced previous sheath in the right femoral vein. LONG 8F SHEATH EXCHANGED FOR SAME SHORT 8F SHEATH

## 2024-10-09 NOTE — H&P
Pre-cardiac Ablation History and Physical  [facility] at [facility]      Patient:  Johanny Bhatt  :  1970  MRN: [id number]    PCP:  Viktor De Oliveira MD  PHONE:  [phone]    DATE: 10/9/2024  ID: Johanny Bhatt is a 54 y.o. male from [facility]    CC: atrial flutter    Problem List:  Atrial flutter  TTE 2024: LVEF 66-70%, grade 1 diastolic dysfunction, no significant VHD, RVSP 35-45  48h monitor 2024: 50/75/118 bpm, 15% atrial flutter longest 2.3 hours, 0.4% PVCs  Bifascicular block  Cardiac MRI 2024: normal sized LV with LVEF 58%, no scar, normal RV and RV systolic fx, no significant VHD  Hypertension  Dyslipidemia    BRIEF HPI:   Mr. Bhatt is a 54-year-old male with the above medical history who presents for atrial flutter ablation.  He experiences palpitations and fatigue.  He denies any chest pain, shortness of breath, orthopnea, syncopal episode or lower extremity edema.      Allergies:      No Known Allergies    MEDICATIONS:  Current Outpatient Medications   Medication Instructions    apixaban (ELIQUIS) 5 mg, Oral, 2 Times Daily    losartan (COZAAR) 50 mg, Oral, Daily    rosuvastatin (CRESTOR) 10 mg, Oral, Daily       Past medical & surgical history, social and family history reviewed in the electronic medical record.    ROS: Pertinent positives listed in the HPI and problem list above. All others reviewed and negative.     Physical Exam:  140 bpm, 124/76 mmHg, 98% spO2    Constitutional:    Well-appearing 54 y.o. y/o adult  in no acute distress        Heart:    Irregular rhythm and normal rate, no murmurs, rubs or gallops   Lungs:     Clear to auscultation bilaterally, no wheezes, rhales or rhonchi, nonlabored respirations       Extremities:   No gross deformities, no edema, clubbing, or cyanosis.    Pulses:    Neuro:  Psych:   Radial pulses palpable and equal bilaterally.  No gross focal deficits  Mood and behavior appropriate for  "situation       Labs and Diagnostic Data:  Lab Results   Component Value Date    GLUCOSE 98 10/04/2024    BUN 17 10/04/2024    CREATININE 1.10 10/04/2024     10/04/2024    K 4.5 10/04/2024     10/04/2024    CALCIUM 9.3 10/04/2024    BCR 15.5 10/04/2024    ANIONGAP 11.0 10/04/2024    EGFR 78.8 10/04/2024     Lab Results   Component Value Date    WBC 8.60 10/04/2024    HGB 15.9 10/04/2024    HCT 47.0 10/04/2024    MCV 91.6 10/04/2024     10/04/2024     No results found for: \"TSH\"  No results found for: \"HGBA1C\"  No results found for: \"CHOL\", \"CHLPL\", \"TRIG\", \"HDL\", \"LDL\", \"LDLDIRECT\"          Tele: Atrial flutter    IMPRESSION:  Mr. Bhatt is a 54-year-old male with typical atrial flutter who presents for catheter ablation.  FELIPE Martinez last night    PLAN:  Procedure to perform: Atrial flutter ablation. Risks, benefits and alternatives to the procedure explained to the patient and he understands and wishes to proceed.     Electronically signed by Kirt Garsia PA-C, 10/09/24, 1:29 PM EDT.        "

## 2024-10-09 NOTE — Clinical Note
Replaced previous sheath in the right femoral vein. [facility]

## 2024-10-09 NOTE — Clinical Note
Replaced previous sheath in the left femoral vein. LONG 9F SHEATH EXCHANGED FOR SAME SHORT 8F SHEATH

## 2024-10-10 ENCOUNTER — CALL CENTER PROGRAMS (OUTPATIENT)
Dept: CALL CENTER | Facility: HOSPITAL | Age: 54
End: 2024-10-10
Payer: COMMERCIAL

## 2024-10-10 NOTE — OUTREACH NOTE
PCI/Device Survey      Flowsheet Row Responses   Facility patient discharged from? [facility]   Procedure date 10/09/24   Procedure (if device, specify in description) Ablation   Performing MD Dr. Louis Branch  [right and left femoral]   Attempt successful? Yes   Call start time 1121   Call end time 1127   Has the patient had any of the following symptoms since discharge? --  [denies any issues or concerns today]   Nursing interventions Patient education provided   Nursing intervention --  [no changes in medications noted]   Does the patient have any of the following symptoms related to the cath/surgical site? --  [dressings intact and to remove after 9pm, no bleeding noted.  Reviewed activity, lifting and bathing precautions.]   Nursing intervention Patient education provided   Does the patient have an appointment scheduled with the cardiologist? Yes   Appointment comments 1/3/24   Did the patient feel prepared to go home on the same day as the procedure? Yes   Is the patient satisfied with the same day discharge process? Yes   Discharge process comments Pt satisfied with services but reports 2-3 hour procedure delay time,  reports staff kept him informed regarding delay which was appreciated.   PCI/Device call completed Yes   Wrap up additional comments Reviewed stroke type s/s and need to seek care if present.  Pt reports he is doing very well at time of call            PABLO MCBRIDE - Registered Nurse

## 2024-12-02 RX ORDER — APIXABAN 5 MG/1
5 TABLET, FILM COATED ORAL 2 TIMES DAILY
Qty: 60 TABLET | Refills: 3 | Status: SHIPPED | OUTPATIENT
Start: 2024-12-02

## 2025-03-27 ENCOUNTER — OFFICE VISIT (OUTPATIENT)
Dept: CARDIOLOGY | Facility: CLINIC | Age: 55
End: 2025-03-27
Payer: COMMERCIAL

## 2025-03-27 VITALS
OXYGEN SATURATION: 98 % | WEIGHT: 190 LBS | DIASTOLIC BLOOD PRESSURE: 58 MMHG | BODY MASS INDEX: 27.2 KG/M2 | SYSTOLIC BLOOD PRESSURE: 130 MMHG | HEART RATE: 85 BPM | HEIGHT: 70 IN

## 2025-03-27 DIAGNOSIS — I48.3 TYPICAL ATRIAL FLUTTER: Chronic | ICD-10-CM

## 2025-03-27 DIAGNOSIS — I10 ESSENTIAL HYPERTENSION: Chronic | ICD-10-CM

## 2025-03-27 DIAGNOSIS — I45.2 BIFASCICULAR BLOCK: Primary | Chronic | ICD-10-CM

## 2025-03-27 NOTE — PROGRESS NOTES
Cardiac Electrophysiology Outpatient Note  [facility] at [facility]    Office Visit     Johanny Bhatt  [id number]  03/27/2025    Primary Care Physician: Viktor De Oliveira MD    Referred By: No ref. provider found    Subjective     Chief Complaint   Patient presents with    Typical atrial flutter     Problem List:  Atrial flutter  TTE 6/2024: LVEF 66-70%, grade 1 diastolic dysfunction, no significant VHD, RVSP 35-45  48h monitor 6/2024: 50/75/118 bpm, 15% atrial flutter longest 2.3 hours, 0.4% PVCs  CTI dependent atrial flutter ablation 10/2024  Bifascicular block  Cardiac MRI 7/2024: normal sized LV with LVEF 58%, no scar, normal RV and RV systolic fx, no significant VHD  Hypertension  Dyslipidemia    History of Present Illness:   Johanny Bhatt is a 54 y.o. male who presents to my electrophysiology clinic for follow up of atrial flutter s/p CTI dependent atrial flutter ablation in October 2024, PVCs and bifascicular block.  Since the ablation, the patient has felt well.  Denies any chest pain, shortness of breath, palpitations, lightheadedness/dizziness, edema or near/full syncope.  He is able to get his heart rate well above 100 with exercise.      Past Medical History:   Diagnosis Date    Abnormal EKG     Atrial flutter     Elevated cholesterol     Hypertension     Right bundle branch block     Wears glasses        Past Surgical History:   Procedure Laterality Date    ABLATION OF DYSRHYTHMIC FOCUS      CARDIAC ELECTROPHYSIOLOGY PROCEDURE N/A 10/09/2024    Procedure: Ablation atrial flutter (typical); moderate sedation; hold eliquis morning of procedure;  Surgeon: Louis Branch MD;  Location: [facility] INVASIVE LOCATION;  Service: Cardiovascular;  Laterality: N/A;    OTHER SURGICAL HISTORY      10/9/2024 ablation per Dr. Branch    TONSILLECTOMY         Family History   Problem Relation Age of Onset    Heart attack Father        Social History  "    Socioeconomic History    Marital status:    Tobacco Use    Smoking status: Never    Smokeless tobacco: Never   Vaping Use    Vaping status: Never Used   Substance and Sexual Activity    Alcohol use: Yes     Alcohol/week: 1.0 standard drink of alcohol     Types: 1 Cans of beer per week    Drug use: Never    Sexual activity: Yes     Partners: Female     Birth control/protection: I.U.D.         Current Outpatient Medications:     Eliquis 5 MG tablet tablet, TAKE 1 TABLET BY MOUTH 2 TIMES A DAY, Disp: 60 tablet, Rfl: 3    losartan (COZAAR) 50 MG tablet, Take 1 tablet by mouth Daily., Disp: , Rfl:     rosuvastatin (CRESTOR) 10 MG tablet, Take 1 tablet by mouth Daily., Disp: , Rfl:     Allergies:   No Known Allergies    Objective   Vital Signs: Blood pressure 130/58, pulse 85, height 177.8 cm (70\"), weight 86.2 kg (190 lb), SpO2 98%.    PHYSICAL EXAM  General appearance: Awake, alert, cooperative  Head: Normocephalic, without obvious abnormality, atraumatic  Lungs: Clear to ascultation bilaterally  Heart: Regular rate and rhythm, no murmurs, no lower extremity swelling  Skin: Skin color, turgor normal, no rashes or lesions  Neurologic: Grossly normal     Lab Results   Component Value Date    GLUCOSE 98 10/04/2024    CALCIUM 9.3 10/04/2024     10/04/2024    K 4.5 10/04/2024    CO2 24.0 10/04/2024     10/04/2024    BUN 17 10/04/2024    CREATININE 1.10 10/04/2024    BCR 15.5 10/04/2024    ANIONGAP 11.0 10/04/2024     Lab Results   Component Value Date    WBC 8.60 10/04/2024    HGB 15.9 10/04/2024    HCT 47.0 10/04/2024    MCV 91.6 10/04/2024     10/04/2024     No results found for: \"INR\", \"PROTIME\"  No results found for: \"TSH\", \"O5SWGLR\", \"Z4QOMPR\", \"THYROIDAB\"       Results for orders placed in visit on 06/05/24    Adult Transthoracic Echo Complete W/ Cont if Necessary Per Protocol    Interpretation Summary    Left ventricular systolic function is normal. Calculated left ventricular EF = 66% " Left ventricular ejection fraction appears to be 66 - 70%.    Left ventricular diastolic function is consistent with (grade I) impaired relaxation.    Aortic valve area is 3.1 cm2.Normal aortic valve    Peak velocity of the flow distal to the aortic valve is 147.1 cm/s. Aortic valve maximum pressure gradient is 9 mmHg. Aortic valve mean pressure gradient is 5 mmHg.    The mitral valve peak gradient is 3 mmHg. The mitral valve mean gradient is 1 mmHg. The mitral valve area (PHT) is 3.6 cm2. Normal mitral valve    Estimated right ventricular systolic pressure from tricuspid regurgitation is mildly elevated (35-45 mmHg). Calculated right ventricular systolic pressure from tricuspid regurgitation is 38 mmHg.    The aortic root measures 2.9 cm.    Normal RV size and function    No pericardial effusion         I personally viewed and interpreted the patient's EKG/Telemetry/lab data      ECG 12 Lead    Date/Time: 3/27/2025 3:11 PM  Performed by: Kirt Garsia PA-C    Authorized by: Kirt Garsia PA-C  Comparison: compared with previous ECG from 7/3/2024  Similar to previous ECG  Rhythm: sinus rhythm  BPM: 85  Conduction: bifascicular block    Clinical impression: abnormal EKG          Johanny Bhatt  reports that he has never smoked. He has never used smokeless tobacco.        Advance Care Planning   Advance Care Planning: ACP discussion was declined by the patient. Patient has an advance directive (not in EMR), copy requested.     Assessment & Plan    1.  Bifascicular block  While bifascicular block is a nonspecific finding, it is somewhat unusual in an otherwise healthy 54-year-old.  Echocardiogram was normal.  Cardiac MRI was negative for scar or underlying infiltrative process.     2. Typical atrial flutter  S/p CTI dependent atrial flutter ablation in October 2024.  No symptomatic or documented recurrence of atrial flutter.  Recommended continued Eliquis 5 mg twice daily for stroke prophylaxis         3.  Hypertension  BP controlled the office today 130/58.  Continue monotherapy with losartan 50 mg daily    4. PVCs  Asymptomatic.  No indication for medical therapy    Follow Up:  Return in about 6 months (around 9/27/2025).      Thank you for allowing me to participate in the care of your patient. Please do not hesitate to contact me with additional questions or concerns.      Kirt Garsia PA-C  Cardiac Electrophysiology  [facility] / [facility]

## 2025-05-14 ENCOUNTER — TELEPHONE (OUTPATIENT)
Dept: URGENT CARE | Facility: CLINIC | Age: 55
End: 2025-05-14
Payer: COMMERCIAL

## 2025-05-14 DIAGNOSIS — I10 ESSENTIAL HYPERTENSION: Primary | ICD-10-CM

## 2025-05-14 DIAGNOSIS — E78.00 HYPERCHOLESTEROLEMIA: ICD-10-CM

## 2025-05-14 RX ORDER — LOSARTAN POTASSIUM 50 MG/1
50 TABLET ORAL DAILY
Qty: 90 TABLET | Refills: 1 | Status: SHIPPED | OUTPATIENT
Start: 2025-05-14

## 2025-05-14 RX ORDER — ROSUVASTATIN CALCIUM 10 MG/1
10 TABLET, COATED ORAL DAILY
Qty: 90 TABLET | Refills: 1 | Status: SHIPPED | OUTPATIENT
Start: 2025-05-14

## 2025-05-14 NOTE — TELEPHONE ENCOUNTER
Patient's primary care physician has retired.  Patient is here today for annual  examination.    Prescriptions for losartan for hypertension and Crestor for dyslipidemia will be made electronically as patient pursues new primary care physician.

## (undated) DEVICE — INTRO SHEATH ART/FEM ENGAGE .035IN 9F 25CM

## (undated) DEVICE — RADIFOCUS GLIDEWIRE ADVANTAGE GUIDEWIRE: Brand: GLIDEWIRE ADVANTAGE

## (undated) DEVICE — Device: Brand: MEDEX

## (undated) DEVICE — PAD GRND REM POLYHESIVE A/ DISP

## (undated) DEVICE — PINNACLE INTRODUCER SHEATH: Brand: PINNACLE

## (undated) DEVICE — ADULT, W/LG. BACK PAD, RADIOTRANSPARENT ELEMENT AND LEAD WIRE COMPATIBLE W/: Brand: DEFIBRILLATION ELECTRODES

## (undated) DEVICE — SOUNDSTAR ECO 8F G CATHETER: Brand: SOUNDSTAR

## (undated) DEVICE — ST EXT IV SMRTSTE 2VLV FIX M LL 6ML 41

## (undated) DEVICE — Device: Brand: SMARTABLATE

## (undated) DEVICE — SYS CLS VASC/VENI VASCADE MVP 6TO12F

## (undated) DEVICE — ST INF PRI SMRTSTE 20DRP 2VLV 24ML 117

## (undated) DEVICE — Device: Brand: REFERENCE PATCH CARTO 3

## (undated) DEVICE — PINNACLE R/O II INTRODUCER SHEATH WITH RADIOPAQUE MARKER: Brand: PINNACLE

## (undated) DEVICE — SHEATH GUIDE EP CARTO VIZIGO BIDIR 8.5F MD/CRV/22MM 71CM IDE

## (undated) DEVICE — Device: Brand: THERMOCOOL SMARTTOUCH SF

## (undated) DEVICE — Device: Brand: DECANAV

## (undated) DEVICE — LIMB HOLDER, WRIST/ANKLE: Brand: DEROYAL

## (undated) DEVICE — ADULT NASAL CO2 SAMPLING WITH O2 DELIVERY CANNULA FOR CAPNOFLEX MODULE: Brand: VITAL SIGNS™

## (undated) DEVICE — CATH EP LIVEWIRE DUO-DECAPOLAR 7F 2-10-2MM SUP LNG 95CM

## (undated) DEVICE — NDL PERC 1PART ECHOTIP WO/BASEPLT 18G 7CM

## (undated) DEVICE — LEX ELECTRO PHYSIOLOGY: Brand: MEDLINE INDUSTRIES, INC.